# Patient Record
Sex: FEMALE | Race: WHITE | NOT HISPANIC OR LATINO | Employment: FULL TIME | URBAN - METROPOLITAN AREA
[De-identification: names, ages, dates, MRNs, and addresses within clinical notes are randomized per-mention and may not be internally consistent; named-entity substitution may affect disease eponyms.]

---

## 2023-12-04 ENCOUNTER — ANCILLARY PROCEDURE (OUTPATIENT)
Dept: RADIOLOGY | Facility: CLINIC | Age: 38
End: 2023-12-04
Payer: COMMERCIAL

## 2023-12-04 DIAGNOSIS — M79.645 THUMB PAIN, LEFT: Primary | ICD-10-CM

## 2023-12-04 DIAGNOSIS — M79.645 THUMB PAIN, LEFT: ICD-10-CM

## 2023-12-04 PROCEDURE — 73140 X-RAY EXAM OF FINGER(S): CPT | Mod: LEFT SIDE | Performed by: RADIOLOGY

## 2023-12-04 PROCEDURE — 73140 X-RAY EXAM OF FINGER(S): CPT | Mod: LT,FY

## 2023-12-05 ENCOUNTER — OFFICE VISIT (OUTPATIENT)
Dept: SPORTS MEDICINE | Facility: HOSPITAL | Age: 38
End: 2023-12-05
Payer: COMMERCIAL

## 2023-12-05 VITALS — DIASTOLIC BLOOD PRESSURE: 86 MMHG | OXYGEN SATURATION: 97 % | HEART RATE: 78 BPM | SYSTOLIC BLOOD PRESSURE: 125 MMHG

## 2023-12-05 DIAGNOSIS — S62.525A CLOSED NONDISPLACED FRACTURE OF DISTAL PHALANX OF LEFT THUMB, INITIAL ENCOUNTER: Primary | ICD-10-CM

## 2023-12-05 PROCEDURE — 1036F TOBACCO NON-USER: CPT | Performed by: PEDIATRICS

## 2023-12-05 PROCEDURE — 99203 OFFICE O/P NEW LOW 30 MIN: CPT | Performed by: PEDIATRICS

## 2023-12-05 PROCEDURE — 99213 OFFICE O/P EST LOW 20 MIN: CPT | Performed by: PEDIATRICS

## 2023-12-05 PROCEDURE — L3984 UPPER EXT FX ORTHOSIS WRIST: HCPCS | Performed by: PEDIATRICS

## 2023-12-05 RX ORDER — ALBUTEROL SULFATE 90 UG/1
AEROSOL, METERED RESPIRATORY (INHALATION)
COMMUNITY
Start: 2023-01-09 | End: 2024-05-02 | Stop reason: WASHOUT

## 2023-12-05 ASSESSMENT — PAIN - FUNCTIONAL ASSESSMENT: PAIN_FUNCTIONAL_ASSESSMENT: 0-10

## 2023-12-05 ASSESSMENT — PAIN SCALES - GENERAL: PAINLEVEL_OUTOF10: 4

## 2023-12-05 NOTE — PROGRESS NOTES
No chief complaint on file.      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  She was walkingup the steps with packages in her hand and tripped, htting her thumb          Past [facility]K HX:  Specialty Problems    None       ROS  12 point ROS reviewed and is negative except for items listed   none    Social Hx:  Home:  lives with her   PT    Medications:   No current outpatient medications on file prior to visit.     No current facility-administered medications on file prior to visit.         Allergies:  Not on File     Physical Exam:    There were no vitals taken for this visit.   General appearance: Well-appearing well-nourished  Psych: Normal mood and affect    Neuro: Normal sensation to light touch throughout the involved extremities  Vascular: No extremity edema or discoloration.  Skin: negative.  Lymphatic: no regional lymphadenopathy present.  Eyes: no conjunctival injection.    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  She understands that full healing will occur in 8 to 10 weeks as she is more comfortable she can discontinue use of the Exos.    Patient was prescribed a thumb EXOS transverse distal phalanx fx  [orthosis] for [diagnosis].The patient is ambulatory with or without aid; but, has weakness, instability and/or deformity of their [right / left] [extremity] which requires stabilization from this orthosis to improve their function.      Verbal and written instructions for the use, wear schedule, cleaning and application of this item were given.  Patient was instructed that should the brace result in increased pain, decreased sensation, increased swelling, or an overall worsening of their medical condition, to please contact our office immediately.     Orthotic management and training was provided for skin care, modifications due to healing tissues, edema changes, interruption in skin integrity, and safety precautions with the orthosis.          ** Please excuse any errors in grammar or translation related to this dictation. Voice recognition software was utilized to prepare this document. **

## 2024-05-02 ENCOUNTER — OFFICE VISIT (OUTPATIENT)
Dept: OBSTETRICS AND GYNECOLOGY | Facility: CLINIC | Age: 39
End: 2024-05-02
Payer: COMMERCIAL

## 2024-05-02 VITALS
WEIGHT: 158 LBS | SYSTOLIC BLOOD PRESSURE: 113 MMHG | HEIGHT: 69 IN | DIASTOLIC BLOOD PRESSURE: 80 MMHG | BODY MASS INDEX: 23.4 KG/M2

## 2024-05-02 DIAGNOSIS — Z12.4 CERVICAL CANCER SCREENING: ICD-10-CM

## 2024-05-02 DIAGNOSIS — Z31.89 ENCOUNTER FOR FERTILITY PLANNING: ICD-10-CM

## 2024-05-02 DIAGNOSIS — R87.618 OTHER ABNORMAL CYTOLOGICAL FINDING OF SPECIMEN FROM CERVIX: ICD-10-CM

## 2024-05-02 DIAGNOSIS — Z01.419 WELL WOMAN EXAM: Primary | ICD-10-CM

## 2024-05-02 PROCEDURE — 87624 HPV HI-RISK TYP POOLED RSLT: CPT

## 2024-05-02 PROCEDURE — 1036F TOBACCO NON-USER: CPT | Performed by: OBSTETRICS & GYNECOLOGY

## 2024-05-02 PROCEDURE — 99395 PREV VISIT EST AGE 18-39: CPT | Performed by: OBSTETRICS & GYNECOLOGY

## 2024-05-02 PROCEDURE — 88175 CYTOPATH C/V AUTO FLUID REDO: CPT

## 2024-05-02 ASSESSMENT — ENCOUNTER SYMPTOMS
SHORTNESS OF BREATH: 0
BACK PAIN: 0
FLANK PAIN: 0
BLOOD IN STOOL: 0
CONSTIPATION: 0
ABDOMINAL PAIN: 0
UNEXPECTED WEIGHT CHANGE: 0
HEMATURIA: 0
NAUSEA: 0
DIARRHEA: 0
ABDOMINAL DISTENTION: 0
CHILLS: 0
SLEEP DISTURBANCE: 0
DYSURIA: 0
COLOR CHANGE: 0
FEVER: 0
FATIGUE: 0
VOMITING: 0
FREQUENCY: 0
APPETITE CHANGE: 0

## 2024-05-02 ASSESSMENT — PAIN SCALES - GENERAL: PAINLEVEL: 0-NO PAIN

## 2024-05-02 NOTE — PROGRESS NOTES
India Fernandez is a 38 y.o.  here for to discuss fertility issues    Past med hx and past surg hx reviewed with patient and notable for: no new issues  Pt denies major medical problems. She is not on any prescription medications. She has never had any surgery.  She had an abnormal pap 2020 with negative colposcopy. She has not had pap follow up since that time.   She did not have HPV vaccine.     Concerns:    Exercise: regular - soccer once a week and walks dog twice per day  Works in outpatient PT.   works third shift at [employer].  They have had a lot of stress lately d/t 's father diagnosed with lymphoma and  may be bone marrow donor.     GynHx:  Cycles: regular cycles, 4-5 days, first few days are heavy ; denies significant pain with cycles ; no recent changes  No intermenstrual spotting or bleeding.   Sexually active: yes with one partner/   Contraception: none x 1 year    Sexually active with  x 5 years. Used hormonal birth control for 1 1/2 years.  Then used condoms until 1 year ago.  Tracking cycles and trying for sex during fertile window since that time. No positive pregnancy tests in the last year.     Pt reports her  is healthy, does not take any medications, is not a smoker. He does not have children with any other partners.     Patient's last menstrual period was 2024 (exact date).     OB History          0    Para   0    Term   0       0    AB   0    Living   0         SAB   0    IAB   0    Ectopic   0    Multiple   0    Live Births   0                 Objective     Review of Systems   Constitutional:  Negative for appetite change, chills, fatigue, fever and unexpected weight change.   Respiratory:  Negative for shortness of breath.    Cardiovascular:  Negative for chest pain.   Gastrointestinal:  Negative for abdominal distention, abdominal pain, blood in stool, constipation, diarrhea, nausea and vomiting.   Endocrine: Negative for  "cold intolerance and heat intolerance.   Genitourinary:  Negative for dyspareunia, dysuria, flank pain, frequency, genital sores, hematuria, menstrual problem, pelvic pain, urgency, vaginal bleeding, vaginal discharge and vaginal pain.   Musculoskeletal:  Negative for back pain.   Skin:  Negative for color change.   Psychiatric/Behavioral:  Negative for sleep disturbance.        /80 (BP Location: Left arm, Patient Position: Sitting)   Ht 1.74 m (5' 8.5\")   Wt 71.7 kg (158 lb)   LMP 04/16/2024 (Exact Date)   BMI 23.67 kg/m²     Physical Exam  Constitutional:       Appearance: Normal appearance.   HENT:      Head: Normocephalic and atraumatic.   Chest:   Breasts:     Right: Normal.      Left: Normal.   Abdominal:      General: Abdomen is flat.      Palpations: Abdomen is soft.      Tenderness: There is no abdominal tenderness.   Genitourinary:     General: Normal vulva.      Vagina: Normal.      Cervix: Normal.      Uterus: Normal.       Adnexa: Right adnexa normal and left adnexa normal.      Comments: Pap collected today    Skin:     General: Skin is warm and dry.   Neurological:      Mental Status: She is alert and oriented to person, place, and time.   Psychiatric:         Mood and Affect: Mood normal.          Assessment and Plan:  Routine Well Woman Exam Today.   Discussed diet and exercise and routine health screening.   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 Recommend referral to [facility]. Pt agreeable. Encouraged first available appointment with  or [facility] if patient desires.       No orders of the defined types were placed in this encounter.     "

## 2024-05-15 ENCOUNTER — TELEPHONE (OUTPATIENT)
Dept: OBSTETRICS AND GYNECOLOGY | Facility: CLINIC | Age: 39
End: 2024-05-15
Payer: COMMERCIAL

## 2024-05-15 NOTE — TELEPHONE ENCOUNTER
Called patient to discuss results   Left vm for patient to return call.    SYLWIA Kraft RN      ----- Message from Anita PEDERSEN MD sent at 5/15/2024  3:29 PM EDT -----  Please let patient know her pap smear was normal.  Her prior pap was LGSIL, HPV+.  I recommend repeat pap in one year and if that is normal she can return to routine screening. Thank you.

## 2024-05-16 ENCOUNTER — TELEPHONE (OUTPATIENT)
Dept: OBSTETRICS AND GYNECOLOGY | Facility: CLINIC | Age: 39
End: 2024-05-16
Payer: COMMERCIAL

## 2024-05-16 NOTE — TELEPHONE ENCOUNTER
Called patient to discuss results.  Identified by name and   Informed patient of normal results and recommended follow up is 1 year, and that if results are still normal then she may go back to routine screenings.   Patient verbalized understanding, no questions or concerns at this time.    SYLWIA Kraft RN    ----- Message from Anita PEDERSEN MD sent at 5/15/2024  3:29 PM EDT -----  Please let patient know her pap smear was normal.  Her prior pap was LGSIL, HPV+.  I recommend repeat pap in one year and if that is normal she can return to routine screening. Thank you.

## 2024-08-06 ASSESSMENT — LIFESTYLE VARIABLES
TOBACCO_USE: NO
HISTORY_ALCOHOL_USE: YES

## 2024-08-12 ENCOUNTER — CONSULT (OUTPATIENT)
Dept: ENDOCRINOLOGY | Facility: CLINIC | Age: 39
End: 2024-08-12
Payer: COMMERCIAL

## 2024-08-12 VITALS
HEIGHT: 69 IN | SYSTOLIC BLOOD PRESSURE: 123 MMHG | WEIGHT: 157 LBS | BODY MASS INDEX: 23.25 KG/M2 | DIASTOLIC BLOOD PRESSURE: 81 MMHG | HEART RATE: 67 BPM

## 2024-08-12 DIAGNOSIS — Z11.3 SCREENING FOR STDS (SEXUALLY TRANSMITTED DISEASES): ICD-10-CM

## 2024-08-12 DIAGNOSIS — Z01.83 ENCOUNTER FOR RH BLOOD TYPING: ICD-10-CM

## 2024-08-12 DIAGNOSIS — Z31.89 ENCOUNTER FOR FERTILITY PLANNING: ICD-10-CM

## 2024-08-12 DIAGNOSIS — Z31.41 FERTILITY TESTING: Primary | ICD-10-CM

## 2024-08-12 DIAGNOSIS — Z11.59 ENCOUNTER FOR SCREENING FOR OTHER VIRAL DISEASES: ICD-10-CM

## 2024-08-12 DIAGNOSIS — Z01.812 ENCOUNTER FOR PREPROCEDURAL LABORATORY EXAMINATION: ICD-10-CM

## 2024-08-12 PROCEDURE — 99214 OFFICE O/P EST MOD 30 MIN: CPT | Performed by: OBSTETRICS & GYNECOLOGY

## 2024-08-12 ASSESSMENT — PAIN SCALES - GENERAL: PAINLEVEL: 0-NO PAIN

## 2024-08-12 NOTE — PROGRESS NOTES
Visit Type: In Person    NEW FERTILITY PATIENT VISIT    Referred by: Referred by:: Dr. Anita Bergman    Accompanied today by: Who is accompanying you to your visit:: Tres ()      India Fernandez is a 39 y.o.  female who presents with primary infertility.  She and her partner desire to proceed with fertility testing.   this couple has been attempting to become pregnant over the course of the last 1 to 2 years without success.  The patient does have a history of OCP use, and she discontinued these medications 3 years ago.  She does report having regular, monthly menses.  She reports no prior attempts at becoming pregnant.  Her partner has no children from prior relationships.      Have you had any concerns about your fertility treatments so far? Have you had any concerns about your fertility treatments so far: No cutrent treatments.     What are you goals for today's visit? What are you goals for today's visit: Discuss steps to promote pregnancy     What causes of infertility have been identified on your workup so far? What causes of infertility have been identified on your workup so far: I have not had any workup so far.     Past Infertility Treatments: Have you undergone any treatments for fertility: No      Please summarize your fertility treatments to date.       PRIOR EVALUATION / TREATMENT    Prior Labs  Lab Results    Date Done      AMH: No results found for requested labs within last 1825 days. No results found for requested labs within last 1825 days.   TSH: 1.85 (Ref range: 0.44 - 3.98 mIU/L) 3/3/2021   PRL: No results found for requested labs within last 1825 days. No results found for requested labs within last 1825 days.   Testosterone: No results found for requested labs within last 1825 days. No results found for requested labs within last 1825 days.   DHEAS: No results found for requested labs within last 1825 days. No results found for requested labs within last 1825 days.    FSH: No results found for requested labs within last 1825 days. No results found for requested labs within last 1825 days.   17 OHP: No results found for requested labs within last 1825 days. No results found for requested labs within last 1825 days.   HgbA1c: No results found for requested labs within last 1825 days. No results found for requested labs within last 1825 days.   Hepatitis B surface antigen: No results found for requested labs within last 1825 days. No results found for requested labs within last 1825 days.   Hepatitis C antibody: No results found for requested labs within last 1825 days. No results found for requested labs within last 1825 days.   HIV ½ Antigen Antibody screen with reflex: No results found for requested labs within last 1825 days. No results found for requested labs within last 1825 days.   Syphilis screening with reflex: No results found for requested labs within last 1825 days. No results found for requested labs within last 1825 days.   GC: No results found for requested labs within last 1825 days. No results found for requested labs within last 1825 days.   CT: No results found for requested labs within last 1825 days. No results found for requested labs within last 1825 days.   Type and Screen: No results found for requested labs within last 1825 days. No results found for requested labs within last 1825 days.   Rh: No results found for requested labs within last 1825 days. No results found for requested labs within last 1825 days.   Antibody: No results found for requested labs within last 1825 days. No results found for requested labs within last 1825 days.   Rubella: No results found for requested labs within last 1825 days. No results found for requested labs within last 1825 days.   Varicella: No results found for requested labs within last 1825 days. No results found for requested labs within last 1825 days.   Hemoglobin: No results found for requested labs within last  1825 days. No results found for requested labs within last 1825 days.   Hematocrit: No results found for requested labs within last 1825 days. No results found for requested labs within last 1825 days.   Creatinine: 0.94 (Ref range: 0.50 - 1.05 mg/dL) 3/3/2021   AST:16 (Ref range: 9 - 39 U/L) 3/3/2021   ALT:11 (Ref range: 7 - 45 U/L): 3/3/2021        Relationship Status:       OB Hx     OB History          0    Para   0    Term   0       0    AB   0    Living   0         SAB   0    IAB   0    Ectopic   0    Multiple   0    Live Births   0                 GYN HISTORY   Have you ever been diagnosed with a sexually transmitted disease?    Please select all that are applicable:    Have you ever had Pelvic Inflammatory Disease? Have you ever had Pelvic Inflammatory Disease?: No    Have you had an abnormal PAP smear? Have you had an abnormal PAP smear?: Yes    Date & Result of last PAP smear:   Lab Results   Component Value Date    FINALINTERP  2024         A. THINPREP PAP CERVIX, SCREENING -     Specimen Adequacy  Satisfactory for evaluation; absence of endocervical/transformation zone component    General Categorization  Negative for intraepithelial lesion or malignancy.    Descriptive Interpretation  Negative for intraepithelial lesion or malignancy              Have you ever had an abnormal Mammogram? Have you ever had an abnormal mammogram?: No    Date & result of your last mammogram:    Do you have pelvic pain? Do you have pelvic pain?: No    How many times per week do you have intercourse? If applicable, how many times a week are you having intercourse, trying to get pregnant during your fertile window?: 2-3    Do you have pain with intercourse? Do you have pain with intercourse?: No    Do you use lubricants with intercourse? If applicable, what type of lubricant are you using?: None    Do you have pain with bowel movements? Do you have pain with bowel movements?: No              Do you have  pain with a full bladder? Do you have pain with a full bladder?: No    MENSTRUAL HISTORY  LMP: When was your last menstrual period?: Other    Menarche: If applicable, when was your first occurrence of menstruation?: Age 13    Contraception: What (if any) type of birth control do you currently use?: None    Cycle length: What is the average number of days between menstrual cycles?: 29    Describe your bleeding: Describe your bleeding:: Average    Dysmenorrhea: Are your menstrual periods painful?: Yes       ENDOCRINE/INFERTILITY HISTORY  Duration of infertility: If applicable, what is the approximate date you began trying to get pregnant?: 1 year    Coital Activity/week: If applicable, how many times a week are you having intercourse, trying to get pregnant during your fertile window?: 2-3    Nipple Discharge: Do you experience any loss of milk or liquid discharge from the breasts?: No    Vision changes: Are you experiencing any vision changes?: No    Headaches: Are you experiencing headaches?: No    Excess hair growth: Are you experiencing persistent or worsening hair growth on the face, breasts or lower abdomen?: No    Excessive hair loss: Are you experiencing loss of hair from your scalp?: No    Acne: Are you experiencing acne?: No    Oily skin: Oily skin?: No    Recent weight change  Weight gain: Are you experiencing any increase in weight?: No    Weight loss: Are you experiencing any decrease in weight?: No    Exercise more than 3 times a week: Exercise more than 3 times a week?: No      PMH  Past Medical History:   Diagnosis Date    Abnormal Pap smear of cervix 2011?    HPV (human papilloma virus) infection 2011?        MEDICATIONS  No current outpatient medications on file prior to visit.     No current facility-administered medications on file prior to visit.        PSH  Past Surgical History:   Procedure Laterality Date    COLPOSCOPY  1/9/21?        PSYCH HISTORY  Have you ever been diagnosed with a mental  health Issue?: No    Have you ever been hospitalized for a mental health disorder?: No       SOCIAL HISTORY  Social History     Tobacco Use    Smoking status: Never    Smokeless tobacco: Never   Substance Use Topics    Alcohol use: Yes     Alcohol/week: 4.0 - 6.0 standard drinks of alcohol     Types: 2 - 3 Glasses of wine, 2 - 3 Cans of beer per week     Comment: socially    Drug use: Never     Occupation: Your occupation:: Physical therapist    Have you ever been incarcerated? Have you ever been incarcerated?: No    Do you have a history of domestic violence? Do you have a history of domestic violence?: No    Do you feel safe at home? Do you feel safe at home?: Yes    Do you have a history of any negative sexual experience such as incest or rape? Do you have a history of any negative sexual experience such as incest or rape?: No       PARTNER HISTORY  Partner Name: Partner name:: Tres Fernandez    Partner : Partner :: [id number]    Partner email: Partner email address: [email or web address]    Occupation: Partner occupation:: PA at [employer]    Prior fertility history:    PMH:    PSH: Partner Past Surgical History:: Tonsillectomy and deviated septum ; bone marrow donor     Smoking:Partner: Recent or current tobacco use: No    Alcohol Use: Partner: Recent or current alcohol use: Yes    Drug Use: Partner: Recent or current drug use: No    Medications: Partner Medications: None    Injuries: Partner: Any history of surgeries or injuries in the reproductive area?: No    STD:    Please select all that are applicable:    SA: Prior Semen Analysis completed?: No    SA Results:      FAMILY HISTORY  Family History   Problem Relation Name Age of Onset    Heart disease Father Kieran     Hypertension Father Kieran     Breast cancer Father's Sister Kimani        CANCER HISTORY    Breast: Breast Cancer: Please answer Yes, No or Unsure. If Yes, please, identify which family member.: Yes Aunt on fathers side    Ovarian:  "Ovarian Cancer: Please answer Yes, No or Unsure. If Yes, please, identify which family member.: No    Colon: Colon Cancer: Please answer Yes, No or Unsure. If Yes, please, identify which family member.: No    Endometrial: Endometrial Cancer: Please answer Yes, No or Unsure. If Yes, please, identify which family member.: No      FAMILY VTE HISTORY  Family History of Blood Clots: Blood Clots: Please answer Yes, No or Unsure. If Yes, please, identify which family member.: No      GENETIC HISTORY  Ethnic Background  Patient: Ethnic Background Patient:: White    Partner: Ethnic Background Partner:: Maltese    Genetic Disease in Family  Patient: Patient: Defects and genetic syndromes? Please answer Yes, No or Unsure: No    Partner: Partner: Defects and genetic syndromes? Please answer Yes, No or Unsure: No    Birth Defects in Family  Patient: Patient: Birth defects?: No    Partner: Partner: Birth defects? : No    Genetic screening performed previously:       BMI:   BMI Readings from Last 1 Encounters:   24 23.52 kg/m²     VITALS:  /81 (BP Location: Right arm, Patient Position: Sitting, BP Cuff Size: Adult)   Pulse 67   Ht 1.74 m (5' 8.5\")   Wt 71.2 kg (157 lb)   LMP 2024 (Exact Date)   BMI 23.52 kg/m²     GEN: alert and oriented, cooperative, no distress, appears stated age  Psych:  Exhibits appropriate mood and judgement.  HEENT: NC/AT  Resp: Normal respiratory effort, no use of accessory muscles  Ext: No clubbing, cyanosis, or edema    ASSESSMENT   India is a 39 y.o.  female with primary infertility x 1 to 2 years.she desires to proceed with fertility testing   partner SA:  Ordered    COUNSELING  We discussed causes of infertility including hormonal, egg quality issues, structural problems such as endometriosis, adhesions, or tubal problems, uterine factors such as polyps or fibroids, and sperm issues. Reviewed evaluation of such as well. We discussed various methods for achieving " pregnancy in some detail including, ovulation induction, insemination, superovulation and IVF.        Routine Testing  Fertility Center  STDs Within 1 year   Genetic carrier Waiver/Completed   T&S Within 1 year   AMH Within 1 year   TSH Within 1 year   Rubella/Varicella Within 5 years     PLAN  Orders Placed This Encounter   Procedures    Hysterosalpingogram (HSG)    US pelvis transvaginal    FL hysterosalpingogram    Antimullerian Hormone (Amh)    TSH with reflex to Free T4 if abnormal    Type And Screen    Rubella Antibody, Igg    Varicella Zoster Antibody, Igg    Hepatitis B surface antigen    Hepatitis C Antibody    HIV 1/2 Antigen/Antibody Screen with Reflex to Confirmation    Syphilis Screen with Reflex    C. Trachomatis / N. Gonorrhoeae, Amplified Detection    POCT pregnancy, urine manually resulted       GENETIC SCREENING PATIENT  Will consider    PARTNER  Yes Semen Analysis: Ordered  Yes Genetic screening: Will consider    FOLLOW UP   Consults: Financial  Chart to primary nurse for care coordination and patient check list/education  Enroll in Engaged MD  Take prenatal vitamins, vitamin D 2000 IUs daily  Discussed that pap and mammogram must be updated per ACOG guidelines before treatment can begin  Discussed that treatment cannot proceed until checklist items are complete   6 week follow up with MD Quintero  Additional testing for BMI < 18 or > 40: No  Sperm Donor:      MD Completion:  Ectopic Risk: No  Medically Complex: No    Fertility Plan Update: Follow-up with Dr. Crouch once workup is complete    Maurice Harrison  08/12/2024  9:28 AM

## 2024-08-12 NOTE — LETTER
2024     Anita PEDERSEN MD  [address]  [facility], [facility] [address]  [facility] [address]    Patient: India Fernandez   YOB: 1985   Date of Visit: 2024       Dear Dr. Anita PEDERSEN MD:    Thank you for referring India Fernandez to me for evaluation. Below are my notes for this consultation.  If you have questions, please do not hesitate to call me. I look forward to following your patient along with you.       Sincerely,     Maurice Harrison MD      CC: No Recipients  ______________________________________________________________________________________      Visit Type: In Person    NEW FERTILITY PATIENT VISIT    Referred by: Referred by:: Dr. Anita Bergman    Accompanied today by: Who is accompanying you to your visit:: Tres ()      India Fernandez is a 39 y.o.  female who presents with primary infertility.  She and her partner desire to proceed with fertility testing.   this couple has been attempting to become pregnant over the course of the last 1 to 2 years without success.  The patient does have a history of OCP use, and she discontinued these medications 3 years ago.  She does report having regular, monthly menses.  She reports no prior attempts at becoming pregnant.  Her partner has no children from prior relationships.      Have you had any concerns about your fertility treatments so far? Have you had any concerns about your fertility treatments so far: No cutrent treatments.     What are you goals for today's visit? What are you goals for today's visit: Discuss steps to promote pregnancy     What causes of infertility have been identified on your workup so far? What causes of infertility have been identified on your workup so far: I have not had any workup so far.     Past Infertility Treatments: Have you undergone any treatments for fertility: No      Please summarize your fertility treatments to date.       PRIOR EVALUATION /  TREATMENT    Prior Labs  Lab Results    Date Done      AMH: No results found for requested labs within last 1825 days. No results found for requested labs within last 1825 days.   TSH: 1.85 (Ref range: 0.44 - 3.98 mIU/L) 3/3/2021   PRL: No results found for requested labs within last 1825 days. No results found for requested labs within last 1825 days.   Testosterone: No results found for requested labs within last 1825 days. No results found for requested labs within last 1825 days.   DHEAS: No results found for requested labs within last 1825 days. No results found for requested labs within last 1825 days.   FSH: No results found for requested labs within last 1825 days. No results found for requested labs within last 1825 days.   17 OHP: No results found for requested labs within last 1825 days. No results found for requested labs within last 1825 days.   HgbA1c: No results found for requested labs within last 1825 days. No results found for requested labs within last 1825 days.   Hepatitis B surface antigen: No results found for requested labs within last 1825 days. No results found for requested labs within last 1825 days.   Hepatitis C antibody: No results found for requested labs within last 1825 days. No results found for requested labs within last 1825 days.   HIV ½ Antigen Antibody screen with reflex: No results found for requested labs within last 1825 days. No results found for requested labs within last 1825 days.   Syphilis screening with reflex: No results found for requested labs within last 1825 days. No results found for requested labs within last 1825 days.   GC: No results found for requested labs within last 1825 days. No results found for requested labs within last 1825 days.   CT: No results found for requested labs within last 1825 days. No results found for requested labs within last 1825 days.   Type and Screen: No results found for requested labs within last 1825 days. No results found for  requested labs within last 1825 days.   Rh: No results found for requested labs within last 1825 days. No results found for requested labs within last 1825 days.   Antibody: No results found for requested labs within last 1825 days. No results found for requested labs within last 1825 days.   Rubella: No results found for requested labs within last 1825 days. No results found for requested labs within last 1825 days.   Varicella: No results found for requested labs within last 1825 days. No results found for requested labs within last 1825 days.   Hemoglobin: No results found for requested labs within last 1825 days. No results found for requested labs within last 1825 days.   Hematocrit: No results found for requested labs within last 1825 days. No results found for requested labs within last 1825 days.   Creatinine: 0.94 (Ref range: 0.50 - 1.05 mg/dL) 3/3/2021   AST:16 (Ref range: 9 - 39 U/L) 3/3/2021   ALT:11 (Ref range: 7 - 45 U/L): 3/3/2021        Relationship Status:       OB Hx     OB History          0    Para   0    Term   0       0    AB   0    Living   0         SAB   0    IAB   0    Ectopic   0    Multiple   0    Live Births   0                 GYN HISTORY   Have you ever been diagnosed with a sexually transmitted disease?    Please select all that are applicable:    Have you ever had Pelvic Inflammatory Disease? Have you ever had Pelvic Inflammatory Disease?: No    Have you had an abnormal PAP smear? Have you had an abnormal PAP smear?: Yes    Date & Result of last PAP smear:   Lab Results   Component Value Date    FINALINTERP  2024         A. THINPREP PAP CERVIX, SCREENING -     Specimen Adequacy  Satisfactory for evaluation; absence of endocervical/transformation zone component    General Categorization  Negative for intraepithelial lesion or malignancy.    Descriptive Interpretation  Negative for intraepithelial lesion or malignancy              Have you ever had an abnormal  Mammogram? Have you ever had an abnormal mammogram?: No    Date & result of your last mammogram:    Do you have pelvic pain? Do you have pelvic pain?: No    How many times per week do you have intercourse? If applicable, how many times a week are you having intercourse, trying to get pregnant during your fertile window?: 2-3    Do you have pain with intercourse? Do you have pain with intercourse?: No    Do you use lubricants with intercourse? If applicable, what type of lubricant are you using?: None    Do you have pain with bowel movements? Do you have pain with bowel movements?: No              Do you have pain with a full bladder? Do you have pain with a full bladder?: No    MENSTRUAL HISTORY  LMP: When was your last menstrual period?: Other    Menarche: If applicable, when was your first occurrence of menstruation?: Age 13    Contraception: What (if any) type of birth control do you currently use?: None    Cycle length: What is the average number of days between menstrual cycles?: 29    Describe your bleeding: Describe your bleeding:: Average    Dysmenorrhea: Are your menstrual periods painful?: Yes       ENDOCRINE/INFERTILITY HISTORY  Duration of infertility: If applicable, what is the approximate date you began trying to get pregnant?: 1 year    Coital Activity/week: If applicable, how many times a week are you having intercourse, trying to get pregnant during your fertile window?: 2-3    Nipple Discharge: Do you experience any loss of milk or liquid discharge from the breasts?: No    Vision changes: Are you experiencing any vision changes?: No    Headaches: Are you experiencing headaches?: No    Excess hair growth: Are you experiencing persistent or worsening hair growth on the face, breasts or lower abdomen?: No    Excessive hair loss: Are you experiencing loss of hair from your scalp?: No    Acne: Are you experiencing acne?: No    Oily skin: Oily skin?: No    Recent weight change  Weight gain: Are you  experiencing any increase in weight?: No    Weight loss: Are you experiencing any decrease in weight?: No    Exercise more than 3 times a week: Exercise more than 3 times a week?: No      PMH  Past Medical History:   Diagnosis Date   • Abnormal Pap smear of cervix ?   • HPV (human papilloma virus) infection ?        MEDICATIONS  No current outpatient medications on file prior to visit.     No current facility-administered medications on file prior to visit.        PSH  Past Surgical History:   Procedure Laterality Date   • COLPOSCOPY  21?        PSYCH HISTORY  Have you ever been diagnosed with a mental health Issue?: No    Have you ever been hospitalized for a mental health disorder?: No       SOCIAL HISTORY  Social History     Tobacco Use   • Smoking status: Never   • Smokeless tobacco: Never   Substance Use Topics   • Alcohol use: Yes     Alcohol/week: 4.0 - 6.0 standard drinks of alcohol     Types: 2 - 3 Glasses of wine, 2 - 3 Cans of beer per week     Comment: socially   • Drug use: Never     Occupation: Your occupation:: Physical therapist    Have you ever been incarcerated? Have you ever been incarcerated?: No    Do you have a history of domestic violence? Do you have a history of domestic violence?: No    Do you feel safe at home? Do you feel safe at home?: Yes    Do you have a history of any negative sexual experience such as incest or rape? Do you have a history of any negative sexual experience such as incest or rape?: No       PARTNER HISTORY  Partner Name: Partner name:: Tres Fernandez    Partner : Partner :: [id number]    Partner email: Partner email address: [email or web address]    Occupation: Partner occupation:: PA at [employer]    Prior fertility history:    PMH:    PSH: Partner Past Surgical History:: Tonsillectomy and deviated septum ; bone marrow donor     Smoking:Partner: Recent or current tobacco use: No    Alcohol Use: Partner: Recent or current alcohol use: Yes    Drug Use:  "Partner: Recent or current drug use: No    Medications: Partner Medications: None    Injuries: Partner: Any history of surgeries or injuries in the reproductive area?: No    STD:    Please select all that are applicable:    SA: Prior Semen Analysis completed?: No    SA Results:      FAMILY HISTORY  Family History   Problem Relation Name Age of Onset   • Heart disease Father Kieran    • Hypertension Father Kieran    • Breast cancer Father's Sister Kimani        CANCER HISTORY    Breast: Breast Cancer: Please answer Yes, No or Unsure. If Yes, please, identify which family member.: Yes Aunt on fathers side    Ovarian: Ovarian Cancer: Please answer Yes, No or Unsure. If Yes, please, identify which family member.: No    Colon: Colon Cancer: Please answer Yes, No or Unsure. If Yes, please, identify which family member.: No    Endometrial: Endometrial Cancer: Please answer Yes, No or Unsure. If Yes, please, identify which family member.: No      FAMILY VTE HISTORY  Family History of Blood Clots: Blood Clots: Please answer Yes, No or Unsure. If Yes, please, identify which family member.: No      GENETIC HISTORY  Ethnic Background  Patient: Ethnic Background Patient:: White    Partner: Ethnic Background Partner:: [place]    Genetic Disease in Family  Patient: Patient: Defects and genetic syndromes? Please answer Yes, No or Unsure: No    Partner: Partner: Defects and genetic syndromes? Please answer Yes, No or Unsure: No    Birth Defects in Family  Patient: Patient: Birth defects?: No    Partner: Partner: Birth defects? : No    Genetic screening performed previously:       BMI:   BMI Readings from Last 1 Encounters:   08/12/24 23.52 kg/m²     VITALS:  /81 (BP Location: Right arm, Patient Position: Sitting, BP Cuff Size: Adult)   Pulse 67   Ht 1.74 m (5' 8.5\")   Wt 71.2 kg (157 lb)   LMP 08/11/2024 (Exact Date)   BMI 23.52 kg/m²     GEN: alert and oriented, cooperative, no distress, appears stated age  Psych:  " Exhibits appropriate mood and judgement.  HEENT: NC/AT  Resp: Normal respiratory effort, no use of accessory muscles  Ext: No clubbing, cyanosis, or edema    ASSESSMENT   India is a 39 y.o.  female with primary infertility x 1 to 2 years.she desires to proceed with fertility testing   partner SA:  Ordered    COUNSELING  We discussed causes of infertility including hormonal, egg quality issues, structural problems such as endometriosis, adhesions, or tubal problems, uterine factors such as polyps or fibroids, and sperm issues. Reviewed evaluation of such as well. We discussed various methods for achieving pregnancy in some detail including, ovulation induction, insemination, superovulation and IVF.        Routine Testing  Fertility Center  STDs Within 1 year   Genetic carrier Waiver/Completed   T&S Within 1 year   AMH Within 1 year   TSH Within 1 year   Rubella/Varicella Within 5 years     PLAN  Orders Placed This Encounter   Procedures   • Hysterosalpingogram (HSG)   • US pelvis transvaginal   • FL hysterosalpingogram   • Antimullerian Hormone (Amh)   • TSH with reflex to Free T4 if abnormal   • Type And Screen   • Rubella Antibody, Igg   • Varicella Zoster Antibody, Igg   • Hepatitis B surface antigen   • Hepatitis C Antibody   • HIV 1/2 Antigen/Antibody Screen with Reflex to Confirmation   • Syphilis Screen with Reflex   • C. Trachomatis / N. Gonorrhoeae, Amplified Detection   • POCT pregnancy, urine manually resulted       GENETIC SCREENING PATIENT  Will consider    PARTNER  Yes Semen Analysis: Ordered  Yes Genetic screening: Will consider    FOLLOW UP   Consults: Financial  Chart to primary nurse for care coordination and patient check list/education  Enroll in Engaged MD  Take prenatal vitamins, vitamin D 2000 IUs daily  Discussed that pap and mammogram must be updated per ACOG guidelines before treatment can begin  Discussed that treatment cannot proceed until checklist items are complete   6 week  follow up with MD Quintero  Additional testing for BMI < 18 or > 40: No  Sperm Donor:      MD Completion:  Ectopic Risk: No  Medically Complex: No    Fertility Plan Update: Follow-up with Dr. Crouch once workup is complete    Maurice Harrison  08/12/2024  9:28 AM

## 2024-09-18 ENCOUNTER — ANCILLARY PROCEDURE (OUTPATIENT)
Dept: ENDOCRINOLOGY | Facility: CLINIC | Age: 39
End: 2024-09-18
Payer: COMMERCIAL

## 2024-09-18 DIAGNOSIS — Z31.41 FERTILITY TESTING: ICD-10-CM

## 2024-09-18 PROCEDURE — 99499 UNLISTED E&M SERVICE: CPT | Performed by: OBSTETRICS & GYNECOLOGY

## 2024-09-18 PROCEDURE — 76830 TRANSVAGINAL US NON-OB: CPT

## 2024-09-18 PROCEDURE — 76830 TRANSVAGINAL US NON-OB: CPT | Performed by: OBSTETRICS & GYNECOLOGY

## 2024-09-20 ENCOUNTER — ANCILLARY PROCEDURE (OUTPATIENT)
Dept: ENDOCRINOLOGY | Facility: CLINIC | Age: 39
End: 2024-09-20
Payer: COMMERCIAL

## 2024-09-20 ENCOUNTER — HOSPITAL ENCOUNTER (OUTPATIENT)
Dept: RADIOLOGY | Facility: HOSPITAL | Age: 39
Discharge: HOME | End: 2024-09-20
Payer: COMMERCIAL

## 2024-09-20 DIAGNOSIS — Z31.41 FERTILITY TESTING: ICD-10-CM

## 2024-09-20 DIAGNOSIS — Z31.41 FERTILITY TESTING: Primary | ICD-10-CM

## 2024-09-20 DIAGNOSIS — Z01.812 ENCOUNTER FOR PREPROCEDURAL LABORATORY EXAMINATION: ICD-10-CM

## 2024-09-20 LAB — PREGNANCY TEST URINE, POC: NEGATIVE

## 2024-09-20 PROCEDURE — 2550000001 HC RX 255 CONTRASTS: Performed by: OBSTETRICS & GYNECOLOGY

## 2024-09-20 PROCEDURE — 58340 CATHETER FOR HYSTEROGRAPHY: CPT

## 2024-09-20 PROCEDURE — 74740 X-RAY FEMALE GENITAL TRACT: CPT

## 2024-09-20 NOTE — PROGRESS NOTES
Patient ID: India Fernandez is a 39 y.o. female.    HSG    Date/Time: 9/20/2024 7:06 AM    Performed by: ALINE Matthews  Authorized by: ALINE Matthews    Consent:     Consent obtained:  Verbal and written    Consent given by:  Patient    Risks, benefits, and alternatives were discussed: yes      Risks discussed:  Bleeding, infection and pain  Universal protocol:     Procedure explained and questions answered to patient or proxy's satisfaction: yes      Relevant documents present and verified: yes      Test results available: yes      Imaging studies available: yes      Required blood products, implants, devices, and special equipment available: yes      Immediately prior to procedure, a time out was called: yes      Patient identity confirmed:  Verbally with patient, arm band and hospital-assigned identification number  Indications:     Indications:  Fertility testing  Pre-procedure details:     Skin preparation:  Povidone-iodine  Sedation:     Sedation type:  None  Anesthesia:     Anesthesia method:  None  Procedure specific details:      LANE Garcia CNP performed this procedure      Hysterosalpingogram (HSG) risks, benefits, alternatives, and personnel discussed with patient who agreed to proceed.    Procedural time out        Done in room where procedure done: Yes        Done just before starting procedure: Yes                                                 All members of procedural team involved in time-out: Yes                  Active communication used: Yes                                                         All team members agreed on procedure: Yes                                        Patient correctly identified by two identifiers: Yes                                  Correct side and site identified: Yes                                                     All needed special equipment/instruments available: Yes               Prior to the start of the procedure a time out was taken and  the following were verified: The identity of the patient using two patient identifiers.   Urine pregnancy test was performed and was negative.   Risks, benefits, and alternatives of the procedure were explained to the patient.  Informed consent was obtained.     The patient was placed in the dorsal lithotomy position and a sterile speculum was placed in the vagina. The cervix was sterilized with Betadine x 3. The anterior lip of the cervix was grasped with a single-tooth tenaculum. The acorn cannula was then placed in the cervix. The patient was positioned and images were taken with fluoroscopy as dye was inserted through the cannula. All instruments were then removed. The patient tolerated the procedure well and was discharged home the same day without complications.    Uterus:  normal contour without filling defects  Tubes:  bilateral patency with free spill of dye, normal tubal architecture noted, and some distal dilation noted bilaterally.  Based on these findings, my recommendation is: No further follow up required. DR. Crouch will review the images and if any additional testing is needed I will reach back to patient.    The patient was counseled regarding the above findings and images were reviewed with patient at the time of the exam.     Andreia Garcia CNP 09/20/24 7:50 AM       Post-procedure details:     Procedure completion:  Tolerated well, no immediate complications

## 2024-10-02 ENCOUNTER — APPOINTMENT (OUTPATIENT)
Dept: ENDOCRINOLOGY | Facility: CLINIC | Age: 39
End: 2024-10-02
Payer: COMMERCIAL

## 2024-11-20 ENCOUNTER — TELEMEDICINE (OUTPATIENT)
Dept: ENDOCRINOLOGY | Facility: CLINIC | Age: 39
End: 2024-11-20
Payer: COMMERCIAL

## 2024-11-20 VITALS — BODY MASS INDEX: 23.79 KG/M2 | HEIGHT: 68 IN | WEIGHT: 157 LBS

## 2024-11-20 DIAGNOSIS — Z31.81 ENCOUNTER FOR MALE FACTOR INFERTILITY IN FEMALE PATIENT: Primary | ICD-10-CM

## 2024-11-20 DIAGNOSIS — N97.8 ENCOUNTER FOR MALE FACTOR INFERTILITY IN FEMALE PATIENT: Primary | ICD-10-CM

## 2024-11-20 PROCEDURE — 3008F BODY MASS INDEX DOCD: CPT | Performed by: STUDENT IN AN ORGANIZED HEALTH CARE EDUCATION/TRAINING PROGRAM

## 2024-11-20 PROCEDURE — 99213 OFFICE O/P EST LOW 20 MIN: CPT | Performed by: STUDENT IN AN ORGANIZED HEALTH CARE EDUCATION/TRAINING PROGRAM

## 2024-11-20 PROCEDURE — 1036F TOBACCO NON-USER: CPT | Performed by: STUDENT IN AN ORGANIZED HEALTH CARE EDUCATION/TRAINING PROGRAM

## 2024-11-20 ASSESSMENT — PATIENT HEALTH QUESTIONNAIRE - PHQ9
2. FEELING DOWN, DEPRESSED OR HOPELESS: NOT AT ALL
1. LITTLE INTEREST OR PLEASURE IN DOING THINGS: NOT AT ALL
SUM OF ALL RESPONSES TO PHQ9 QUESTIONS 1 AND 2: 0

## 2024-11-20 ASSESSMENT — PAIN SCALES - GENERAL: PAINLEVEL_OUTOF10: 0-NO PAIN

## 2024-11-20 NOTE — PROGRESS NOTES
Virtual or Telephone Consent: An interactive audio and video telecommunication system which permits real time communications between the patient (at the originating site) and provider (at the distant site) was utilized to provide this telehealth service    Follow Up Visit HPI    Patient is a 39 y.o.  female with  primary infertility x 2 years  presenting today for follow up visit. They were seen by Dr. Harrison on 2024 with plan for TVUS, HSG, and labs (AMH, TSH, prenatals, STIs). Partner SA notable for azoospermia.    Testing to date:   Lab Results from [facility] 10/02/2024  GC/CT negative  AMH 1.65  Rubella immune  RPR nonreactive  HIV nonreactive  Varicella immune  Hep C nonreactive  TSH 2.27  HbSAg negative    Hysterosalpingogram: FL HYSTEROSALPINGOGRAM (2024):  Normal cavity, bilateral spill     GYN Pelvic Ultrasound: US PELVIS TRANSVAGINAL (2024):  Somewhat increased vascularity in myometrium of uterus may be suggestive of adenomyosis. Ovaries within normal limits bilaterally. Small free fluid in the cul-de-sac.     Partner SA: Azoospermia   Latest Reference Range & Units 10/29/24 11:00   Abstinence (days) 2 - 7 days 3   Agglutination (Semen)  No   Clumps (Semen)  No   Collected Completely  Yes   Collection Location  Home   Collection Method  Masturbation   CONCENTRATION CN (Post-Wash) mill/mL 0.02   Concentration(Semen) >=15 mill/mL 0 !   Debris (Semen)  Yes   Leukocyte (Semen)  Negative   Non Prog. Motility (Semen) % 0   NON PROG. MOTILITY CN (Post-Wash) % 0   POCT SEMEN ANALYSIS COMPLETE WITH STRICT MORPHOLOGY  Rpt !   Prog. Motility (Semen) >=32 % 0 !   PROG. MOTILITY CN (Post-Wash) % 0   Semen Appearance  Normal   Semen Liquefaction  Normal   Semen Viscosity  Normal   Total Motility (Semen) >=40 % 0 !   TOTAL MOTILITY CN (Post-Wash) % 0   Total No of Motile (Semen) mill 0   TOTAL NO OF MOTILE CN (Post-wash) mill 0   Total No of Rnd Cells (Semen) <=5 mill 1.0   Total No of Sperm  "(Semen) >=39 mill 0 !   TOTAL NO OF SPERM CN (Post-Wash) mill 0   Volume (Semen) >=1.5 mL 2.6   VOLUME CN (Post-Wash) mL 0.2   !: Data is abnormal  Rpt: View report in Results Review for more information    Treatment to date: None      Past Medical History:   Diagnosis Date    Abnormal Pap smear of cervix ?    HPV (human papilloma virus) infection ?     Past Surgical History:   Procedure Laterality Date    COLPOSCOPY  21?     No current outpatient medications on file prior to visit.     No current facility-administered medications on file prior to visit.       BMI:   BMI Readings from Last 1 Encounters:   24 23.87 kg/m²     VITALS:  Ht 1.727 m (5' 8\")   Wt 71.2 kg (157 lb)   LMP 2024   BMI 23.87 kg/m²   LMP: Patient's last menstrual period was 2024.    ASSESSMENT   39 y.o.  female with primary infertility x 2 years, Male infertility and the following pertinent medical issues: none .  Partner SA: Azoospermia    COUNSELING  Discussed results of SA which indicate azoospermia. Also low volume and low concentration. Recommend Consult with Dr. Griggs [phone] for further evaluation and management. Reviewed that azoospermia may be obstructive or nonobstructive and that it may be possible to retrieve sperm via a procedure, in which case IVF would be recommended.   Recommend patient schedule consult with Dr. Anson fontanez and also call our  to schedule IVF consult.     Routine Testing  Fertility Center  STDs Within 1 year   Genetic carrier Waiver/Completed   T&S Within 1 year   AMH Within 1 year   TSH Within 1 year   Rubella/Varicella Within 5 years     BMI Testing  Fertility Center  CBC Within 1 year   CMP Within 1 year   HgbA1c Within 1 year   Mag, Phos, Vit D <18 Within 1 year   MFM > 40  REQ   Wt loss consult > 40 OPT     PLAN  Referral to Male Infertility placed for  given azoospermia  Schedule IVF consult    FOLLOW UP   Consults:  Urology " for partner  Engaged MD  Take prenatal vitamins, vitamin D 2000 IUs daily  Discussed that treatment cannot proceed until checklist items are complete.   Additional testing for BMI < 18 or > 40: NA.  Patient to schedule IVF consult. Advised patient to arrange this now with the .  Chart to primary nurse for care coordination and patient check list/education.    MD Completion:  Ectopic Risk: No  Medically Complex: No    Fertility Plan Update: Referral to Male Infertility placed for  given azoospermia. Schedule IVF consult.    Brittney Crouch  11/20/2024  8:17 AM

## 2024-12-17 ASSESSMENT — LIFESTYLE VARIABLES: CURRENT_SMOKER: NO

## 2024-12-18 ENCOUNTER — PATIENT MESSAGE (OUTPATIENT)
Dept: ENDOCRINOLOGY | Facility: CLINIC | Age: 39
End: 2024-12-18
Payer: COMMERCIAL

## 2024-12-18 ASSESSMENT — LIFESTYLE VARIABLES: CURRENT_SMOKER: NO

## 2024-12-19 ENCOUNTER — CONSULT (OUTPATIENT)
Dept: ENDOCRINOLOGY | Facility: CLINIC | Age: 39
End: 2024-12-19
Payer: COMMERCIAL

## 2024-12-19 DIAGNOSIS — Z01.812 ENCOUNTER FOR PREPROCEDURAL LABORATORY EXAMINATION: ICD-10-CM

## 2024-12-19 DIAGNOSIS — Z31.41 FERTILITY TESTING: Primary | ICD-10-CM

## 2024-12-19 DIAGNOSIS — Z31.83 ENCOUNTER FOR ASSISTED REPRODUCTIVE FERTILITY CYCLE: ICD-10-CM

## 2024-12-19 PROCEDURE — 99215 OFFICE O/P EST HI 40 MIN: CPT | Performed by: STUDENT IN AN ORGANIZED HEALTH CARE EDUCATION/TRAINING PROGRAM

## 2024-12-19 ASSESSMENT — PATIENT HEALTH QUESTIONNAIRE - PHQ9
2. FEELING DOWN, DEPRESSED OR HOPELESS: NOT AT ALL
SUM OF ALL RESPONSES TO PHQ9 QUESTIONS 1 AND 2: 0
1. LITTLE INTEREST OR PLEASURE IN DOING THINGS: NOT AT ALL

## 2024-12-19 NOTE — Clinical Note
Patient for IVF- plan in place for her but partner needs to see Dr. FRANK - mariana JAY that they aren't immediately ready, thanks!

## 2024-12-19 NOTE — PROGRESS NOTES
Visit Type: In Person    IVF Note     Patient is a 39 y.o.  female, here for IVF consult due to Male infertility  Here today with: NA  LMP: Patient's last menstrual period was 2024.  Menstrual cycles: Regular  AMH: 1.65  Status of fallopian tubes: FL HYSTEROSALPINGOGRAM (2024):  Normal cavity, bilateral spill   Saline Ultrasound: never  Hysteroscopy: to be ordered  Past Infertility Treatments: None    GYN HISTORY   HX of abnormal Mammo: No  Pap smear: 2024 NIL, HR HPV neg  Mammogram: never     PMH  Past Medical History:   Diagnosis Date    Abnormal Pap smear of cervix ?    HPV (human papilloma virus) infection ?     History of any clotting: No  History of hospitalizations or surgeries: No  History of easy bleeding/bruising: No    PSH  Past Surgical History:   Procedure Laterality Date    COLPOSCOPY  21?       Genetic screening Hx  Patient: Will defer until partner completes evaluation by Dr. FRANK, if specific genetic screening is recommended for partner would align patient screening    Social history  Social History     Tobacco Use    Smoking status: Never     Passive exposure: Never    Smokeless tobacco: Never   Substance Use Topics    Alcohol use: Yes     Alcohol/week: 4.0 - 6.0 standard drinks of alcohol     Types: 2 - 3 Glasses of wine, 2 - 3 Cans of beer per week     Comment: socially    Drug use: Never     Current smoker: No    Family history  Cognitive deficits: No  Birth defects: No  Other:     Current Meds  No current outpatient medications on file.     No current facility-administered medications for this visit.       Allergies  Patient has no known allergies.    Partner History  Tres Fernandez  Partner :: 83  SA in past year: Yes, Azoospermia      VITALS:  LMP 2024   BMI:   BMI Readings from Last 1 Encounters:   24 23.87 kg/m²       ASSESSMENT   39 y.o.  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 Hyde).  Indication for IVF: Male infertility  Partner SA: Azoospermia    We reviewed IVF and discussed the following:   In-vitro fertilization and embryo transfer  Stimulation protocols   Oocyte retrieval, risks    Cryopreservation   Assessment of fertilization   Embryo development  Statistics  ICSI/Assisted hatching   Embryo transfer and preparation    Risks of OHSS and multiple gestation   Cancelled cycles   Use of birth control   Selective reduction   Number of embryos to transfer   Ectopic pregnancy  and miscarriage  Team based care  Informed consent procedures  Folic acid supplementation   Genetic carrier screening   PGT  Frozen tissue storage and transport process  Discussed that pap and mammogram must be updated per ACOG guidelines before treatment can begin    ART Cycle Plan    1. Protocol/Fertility Plan Update:  Lead in: estrace  Stimulation protocol: Long Lupron ,   Trigger plan: HCG  Pre-retrieval meds: Antibiotics per protocol  Adjuncts:  none  Notes:     2. FET:  Protocol: Programmed  Adjuncts:  none  OCP candidate: Yes  Notes:     3. Insemination: PENDING DR. HYDE EVALUATION/MANAGEMENT  Sperm source: partner  Sperm collection method:  likely to be frozen if obtained via TESE, pending eval/tx with Dr. FRANK  Notes:  ICSI: Yes  # of oocytes to be fertilized: all    4. Transfer:   Number of embryos to replace: 1-2  Stage of embryo transfer: day 5  Trial transfer needed? No    5. Cryopreservation plan  PGT:  Considering, will discuss with partner, aware to decide prior to stim    Freeze all? Yes  Oocyte cryopreservation: No    6. Patient willing to accept blood transfusion: Yes    7. RN to review chart, initiate IVF boarding pass, and assure completion of the following prior to proceeding with IVF stimulation:       Orders Placed This Encounter   Procedures    Hysteroscopy diagnostic    POCT pregnancy, urine manually  resulted       STDs (Hepatitis B, Hepatitis C, HIV, Syphilis, GC/CT) for patient and partner (if applicable) to be completed within the last year (z11.3)  Genetic carrier testing: waiver or carrier screen completed with clearance documentation by provider for both patient and partner (z13.71)  Rubella and varicella to be completed within the last five years (z11.59)   TSH to be completed within the last year (z13.29)  Type & Screen to be completed within the last year (z01.83)  AMH to be completed within the last year (z31.41)  Pre-IVF Imaging: Reference any orders placed by provider.  Cavity evaluation: hysteroscopy  Frozen sperm sample: ensure frozen partner sample (z31.41) or verify donor sperm on site prior to stimulation start date.  Verify in EMR or obtain copy of patient’s last mammogram (if applicable) and pap smear results for provider review in boarding pass.  Enroll in Engaged MD and complete annual consent forms for IVF and cryotransport agreements.  BMI checklist for BMI <18 or >40  Consults: Nursing and Financial Consult.  PAT Consult: No  Ectopic Risk: No  Medically Complex: No  Additional consults  partner to complete Urology consult/tx  and review what is in the boarding pass.    Brittney Crouch  12/19/2024  10:09 AM

## 2025-01-16 NOTE — PROGRESS NOTES
Boarding Pass IVF/INJECTABLE TIC/IUI    Age: 39 y.o.    Provider: [facility] Providers:32412}  Primary RN: ***  Reasons for Treatment: {JANELL [id number]:10598}  Last BMI  24 : 23.87 kg/m²       Past Medical History:   Diagnosis Date    Abnormal Pap smear of cervix ?    HPV (human papilloma virus) infection ?       Date Done Consultation Results/Comments   *** Medication Protocol Lead in: {JANELL LEAD IN:35783}  Fertility Plan Update: ***  Trigger plan: {JANELL TRIGGER PLAN:81849}  Pre-retrieval meds: Antibiotics per protocol  Adjuncts: {JANELL ADJUNCTS:81064}  Notes: ***   *** Authorization to Share []      *** GYN Waiver []      *** IVF Consult  []    PGT-A/M? {JANELL PGT-A/M:10884}   *** IVF Information and Authorization (to be completed annually) Received and in chart: {JANELL Yes (Name):61462}   ***  Waiver (Out) Form Received and in chart: {JANELL Yes (Name):63196}   *** [employer] Packet []    *** Procedure Order Placed []       *** MFM Consult Okay to proceed? {JANELL Yes, No, N/A:22944}   *** Psych Consult Okay to proceed? {JANELL Yes, No, N/A:33765}   *** Genetics Consult Okay to proceed? {JANELL Yes, No, N/A:07684}   *** Other    Date Done Female Labs Results/Comments   No results found for requested labs within last 365 days. T&S (Q 1 Year) ABO: No results found for requested labs within last 365 days.  Rh: No results found for requested labs within last 365 days.  Antibody: No results found for requested labs within last 365 days.     No results found for requested labs within last 365 days. Hep B sAg No results found for requested labs within last 365 days.   No results found for requested labs within last 365 days. Hep C AB No results found for requested labs within last 365 days.   No results found for requested labs within last 365 days. HIV No results found for requested labs within last 365 days.   No results found for requested labs within last 365 days. Syphilis No results found for requested labs  within last 365 days.   No results found for requested labs within last 365 days. GC/CT GC: No results found for requested labs within last 365 days.  CT: No results found for requested labs within last 365 days.   No results found for requested labs within last 1825 days. Rubella (Q 5 Years) No results found for requested labs within last 1825 days.   No results found for requested labs within last 1825 days. Varicella (Q 5 Years) No results found for requested labs within last 1825 days.   No results found for requested labs within last 365 days. TSH No results found for requested labs within last 365 days.   No results found for requested labs within last 365 days. HgbA1C No results found for requested labs within last 365 days.   No results found for requested labs within last 365 days. AMH No results found for requested labs within last 365 days.   *** Carrier Screening Myriad 2bP: ***  {JAENLL Carrier Screenin}  {JANELL Carrier Screening Neg/Pos:52919}    Uterine Cavity Eval Pelvic US: ***    HSG: FL HYSTEROSALPINGOGRAM (2024):     SIS: ***    Hyster: (***)     2024 Pap Smear Lab Results   Component Value Date    FINALINTERP  2024         A. THINPREP PAP CERVIX, SCREENING -     Specimen Adequacy  Satisfactory for evaluation; absence of endocervical/transformation zone component    General Categorization  Negative for intraepithelial lesion or malignancy.    Descriptive Interpretation  Negative for intraepithelial lesion or malignancy            CVTFINALDX  2021     A. ENDOCERVIX, CURETTAGE:  -- MINUTE FRAGMENT OF ENDOCERVICAL MUCOSA WITH MILD CHRONIC INFLAMMATION AND  REACTIVE EPITHELIAL CHANGES.    The gross and/or microscopic findings were reviewed in conjunction with  pathology resident, Matt Hernnadez DO.              *** Mammogram ( > 40) ***               Date Done Male Labs   Results/Comments  ALYCE LORENZO (MRN: [id number])   2024 Hep B sAg Nonreactive   2024 Hep  C AB  Nonreactive   2024 HIV Nonreactive   *** Syphilis ***   *** GC/CT GC: ***  CT: ***   *** Carrier Screening 2.6  {JANELL Carrier Screenin}  {JANELL Carrier Screening Neg/Pos:76578}   10/29/2024 Semen Analysis  Volume(mL): 2.6  Concentration(million/mL): 0  Motility(%): 0  Motile Count(million): ***   *** Sperm Freeze  # of vials: ***  TMS post thaw: ***   Date Done Miscellaneous Results/Comments   N/A BMI Checklist  BMI > 40 or < 18 Added to chart:   No   N/A >= 45 Checklist  Added to chart:   No   **Does not need to be completed prior to placing on IVF calendar**    MD Completion:  PAT needed: {YES/NO/NA:86980}  Ectopic Risk: {YES/NO/NA:43431}  Medically Complex: {YES/NO/NA:76947}

## 2025-01-28 ENCOUNTER — TELEPHONE (OUTPATIENT)
Dept: ENDOCRINOLOGY | Facility: CLINIC | Age: 40
End: 2025-01-28

## 2025-01-28 NOTE — TELEPHONE ENCOUNTER
Reason for call: reporting start of cycle  LMP:1/26/25  Treatment type:  Hysteroscopy: Is biopsy needed?   Note:

## 2025-02-03 ENCOUNTER — PREP FOR PROCEDURE (OUTPATIENT)
Dept: ENDOCRINOLOGY | Facility: CLINIC | Age: 40
End: 2025-02-03
Payer: COMMERCIAL

## 2025-02-03 RX ORDER — KETOROLAC TROMETHAMINE 30 MG/ML
30 INJECTION, SOLUTION INTRAMUSCULAR; INTRAVENOUS ONCE AS NEEDED
Status: CANCELLED | OUTPATIENT
Start: 2025-02-03 | End: 2025-02-08

## 2025-02-03 RX ORDER — ACETAMINOPHEN 325 MG/1
650 TABLET ORAL ONCE AS NEEDED
Status: CANCELLED | OUTPATIENT
Start: 2025-02-03

## 2025-02-04 ENCOUNTER — HOSPITAL ENCOUNTER (OUTPATIENT)
Dept: ENDOCRINOLOGY | Facility: CLINIC | Age: 40
Discharge: HOME | End: 2025-02-04
Payer: COMMERCIAL

## 2025-02-04 VITALS
SYSTOLIC BLOOD PRESSURE: 130 MMHG | OXYGEN SATURATION: 99 % | HEIGHT: 68 IN | HEART RATE: 86 BPM | BODY MASS INDEX: 24.29 KG/M2 | WEIGHT: 160.27 LBS | DIASTOLIC BLOOD PRESSURE: 83 MMHG | TEMPERATURE: 98.1 F | RESPIRATION RATE: 18 BRPM

## 2025-02-04 DIAGNOSIS — Z31.41 FERTILITY TESTING: ICD-10-CM

## 2025-02-04 LAB — PREGNANCY TEST URINE, POC: NEGATIVE

## 2025-02-04 PROCEDURE — 64435 NJX AA&/STRD PARACRV NRV: CPT | Mod: GC | Performed by: OBSTETRICS & GYNECOLOGY

## 2025-02-04 PROCEDURE — 58555 HYSTEROSCOPY DX SEP PROC: CPT | Performed by: OBSTETRICS & GYNECOLOGY

## 2025-02-04 PROCEDURE — 58555 HYSTEROSCOPY DX SEP PROC: CPT | Mod: GC | Performed by: OBSTETRICS & GYNECOLOGY

## 2025-02-04 RX ORDER — ACETAMINOPHEN 325 MG/1
650 TABLET ORAL ONCE AS NEEDED
Status: DISCONTINUED | OUTPATIENT
Start: 2025-02-04 | End: 2025-02-05 | Stop reason: HOSPADM

## 2025-02-04 RX ORDER — KETOROLAC TROMETHAMINE 30 MG/ML
30 INJECTION, SOLUTION INTRAMUSCULAR; INTRAVENOUS ONCE AS NEEDED
Status: DISCONTINUED | OUTPATIENT
Start: 2025-02-04 | End: 2025-02-05 | Stop reason: HOSPADM

## 2025-02-04 ASSESSMENT — PAIN SCALES - GENERAL: PAINLEVEL_OUTOF10: 0 - NO PAIN

## 2025-02-04 ASSESSMENT — PAIN - FUNCTIONAL ASSESSMENT: PAIN_FUNCTIONAL_ASSESSMENT: 0-10

## 2025-02-04 NOTE — PROGRESS NOTES
Patient ID: India Fernandez is a 39 y.o. female.    Hysteroscopy diagnostic    Date/Time: 2/4/2025 2:09 PM    Performed by: Mago Brar MD  Authorized by: Brittney Crouch MD    Consent:     Consent obtained:  Verbal and written    Consent given by:  Patient    Risks, benefits, and alternatives were discussed: yes      Risks discussed:  Bleeding, infection and pain  Universal protocol:     Procedure explained and questions answered to patient or proxy's satisfaction: yes      Relevant documents present and verified: yes      Test results available: yes      Imaging studies available: yes      Required blood products, implants, devices, and special equipment available: yes      Immediately prior to procedure, a time out was called: yes      Patient identity confirmed:  Verbally with patient, arm band and hospital-assigned identification number  Pre-procedure details:     Skin preparation:  Povidone-iodine  Procedure specific details:      Procedure: Diagnostic Hysteroscopy   Preop diagnosis: fertility testing  Post op diagnosis: Same   Assistant: Resident and Fellow    Anesthesia: None   IV: None   EBL: 3 cc  Specimens: None   Complications: None   Risks benefits and alternatives of the procedure explained to the patient and informed consent was obtained. Urine pregnancy test was performed and was negative. Time out was performed. The patient was placed in the dorsal lithotomy position and a sterile speculum was placed in the vagina. The cervix was sterilized with Betadine x3. Paracervical block with lidocaine 1% was administered.   Tenaculum: YES  Dilation: NO  The hysteroscope was placed in the cervix and advanced into the uterine cavity. Normal saline was used for distension media. Images were obtained and findings noted as below.   All instruments were then removed. Good hemostasis was noted. Patient tolerated the procedure well returned to the recovery area in stable condition. .   Findings:   Cavity:  Smooth cavity no lesions noted  Ostia: Bilateral tubal ostia visualized  Additional Notes:          Post-procedure details:     Procedure completion:  Tolerated well, no immediate complications

## 2025-04-10 ENCOUNTER — TELEPHONE (OUTPATIENT)
Dept: ENDOCRINOLOGY | Facility: CLINIC | Age: 40
End: 2025-04-10
Payer: COMMERCIAL

## 2025-04-10 NOTE — TELEPHONE ENCOUNTER
Returned patient's call.   Patient calling regarding next steps, her  will have TESE procedure completed on 4/25.   Let patient know her partner still needs to sign consent forms via [employer], reminder email sent.   Cycle orders pended.   Patient aware to call the office with her next period to be scheduled for cycle day 21 P4 level to start estrace lead in for IVF cycle.     Lillian Alvarenga 04/10/25 11:12 AM

## 2025-04-10 NOTE — TELEPHONE ENCOUNTER
Reason for call: Patient partner is having his TESE on 4/25/25. She is calling to get her retrieval medications and next steps.  Notes:

## 2025-04-11 ENCOUNTER — SPECIALTY PHARMACY (OUTPATIENT)
Dept: PHARMACY | Facility: CLINIC | Age: 40
End: 2025-04-11

## 2025-04-14 DIAGNOSIS — N97.9 FEMALE INFERTILITY: ICD-10-CM

## 2025-04-14 RX ORDER — SYRINGE, DISPOSABLE, 3 ML
SYRINGE, EMPTY DISPOSABLE MISCELLANEOUS
Qty: 30 EACH | Refills: 3 | Status: SHIPPED | OUTPATIENT
Start: 2025-04-14

## 2025-04-14 RX ORDER — CHORIONIC GONADOTROPIN 10000 UNIT
10000 KIT INTRAMUSCULAR ONCE AS NEEDED
Qty: 1 EACH | Refills: 0 | Status: SHIPPED | OUTPATIENT
Start: 2025-04-14

## 2025-04-14 RX ORDER — SYRINGE, DISPOSABLE, 3 ML
SYRINGE, EMPTY DISPOSABLE MISCELLANEOUS
Qty: 1 EACH | Refills: 0 | Status: SHIPPED | OUTPATIENT
Start: 2025-04-14

## 2025-04-14 RX ORDER — SYRINGE-NEEDLE,INSULIN,0.5 ML 27GX1/2"
SYRINGE, EMPTY DISPOSABLE MISCELLANEOUS
Qty: 30 EACH | Refills: 3 | Status: SHIPPED | OUTPATIENT
Start: 2025-04-14

## 2025-04-14 RX ORDER — LEUPROLIDE ACETATE 1 MG/0.2ML
10 KIT SUBCUTANEOUS EVERY EVENING
Qty: 1 KIT | Refills: 0 | Status: SHIPPED | OUTPATIENT
Start: 2025-04-14

## 2025-04-14 NOTE — PROGRESS NOTES
Re-routed all fertility medications and supplies to [facility] pharmacy per insurance mandate. Patient can still fill with [facility] for self-pay if needed. Nursing team notified also.    Medications sent:   Follistim 900 IU cartridge, Menopur 75 unit vials, Pregnyl 10,000IU vial for trigger , and Leuprolide 1mg/0.2mL kit for Long Lupron     Patient has [employer]yn[employer] coverage, must fill with [facility]. Progyny to handle Pas and communicate with [facility].      Yoanna River (Katie), PharmD  [facility]  Clinical Pharmacy Specialist- Fertility   [facility], [place] [address]  Email: [email or web address]  Tel: [phone]       Fax: [phone]

## 2025-04-22 ENCOUNTER — TELEPHONE (OUTPATIENT)
Dept: ENDOCRINOLOGY | Facility: CLINIC | Age: 40
End: 2025-04-22
Payer: COMMERCIAL

## 2025-04-22 DIAGNOSIS — N97.9 FEMALE INFERTILITY: ICD-10-CM

## 2025-04-22 NOTE — TELEPHONE ENCOUNTER
Returned patient's call.  Patient began period 4/21 and would like to get set up for IVF cycle.   Only outstanding boarding pass items are 's TESE procedure scheduled for 4/25 and genetic authorization form.   Let patient know next steps would be scheduling CD21 P4 on 5/12.  Patient let me know she has a vacation scheduled 4/30-6/8 and wondering if that will interfere with her IVF cycle, vacation cannot be rescheduled.   Discussed with patient she will likely be in the middle of her stimulation during that time and would be best to call back with her May period.  Patient to call office with May period.     Lillian Alvarenga 04/22/25 2:14 PM

## 2025-04-22 NOTE — TELEPHONE ENCOUNTER
Caller:   Reason for call: questions regarding next steps  Notes:  Patient started cycle today 4/22 and wants to know what she needs to schedule next

## 2025-06-25 ENCOUNTER — TELEPHONE (OUTPATIENT)
Dept: ENDOCRINOLOGY | Facility: CLINIC | Age: 40
End: 2025-06-25

## 2025-06-25 NOTE — TELEPHONE ENCOUNTER
Reason for call:   Notes: pt called  ans stated that she needs a CD21 progesterone test. Pt wants a call back.

## 2025-06-25 NOTE — TELEPHONE ENCOUNTER
Returned patient's call.   Patient calling to schedule CD 21 P4, states she started her period on 6/15 but was busy and did not get a chance to call the office.   Patient completed all check list items however her  did not have TESE procedure completed as it was cancelled in EPIC/no results available.   Patient states her  did have the procedure but no sperm was retrieved.   Patient would still like to go through with egg freezing at this time until they figure out next steps regarding sperm source.   Let her know I would have to discuss with her provider since their is a change in her plan and call her back.

## 2025-07-22 ENCOUNTER — TELEMEDICINE (OUTPATIENT)
Dept: ENDOCRINOLOGY | Facility: CLINIC | Age: 40
End: 2025-07-22
Payer: COMMERCIAL

## 2025-07-22 DIAGNOSIS — N97.9 FEMALE INFERTILITY: Primary | ICD-10-CM

## 2025-07-22 PROCEDURE — 99215 OFFICE O/P EST HI 40 MIN: CPT | Performed by: OBSTETRICS & GYNECOLOGY

## 2025-07-22 NOTE — PROGRESS NOTES
Virtual or Telephone Consent: An interactive audio and video telecommunication system which permits real time communications between the patient (at the originating site) and provider (at the distant site) was utilized to provide this telehealth service and Verbal consent was requested and obtained from India Fernandez on this date, 25 for a telehealth visit.    IVF Note - Follow up visit    Patient of Dr. Crouch here for follow up:     has azoospermia - had TESE done 2025 and no sperm found  Done at [facility] ([facility] in [place])    At this point patient is not ready to proceed with donor sperm so would like to consider egg freezing    Previous note reviewed:  Patient is a 40 y.o.  female, here for IVF consult due to Male infertility  Here today with: NA  LMP: No LMP recorded.  Menstrual cycles: Regular  AMH: 1.65  Status of fallopian tubes: FL HYSTEROSALPINGOGRAM (2024):  Normal cavity, bilateral spill   Saline Ultrasound: never  Hysteroscopy: to be ordered    GYN Pelvic Ultrasound: US PELVIS TRANSVAGINAL (2024):  Somewhat increased vascularity in myometrium of uterus may be suggestive of adenomyosis. Ovaries within normal limits bilaterally. Small free fluid in the cul-de-sac.        Past Infertility Treatments: None  Fertility Cycles       Cycle Name Treatment Start Date Type Outcome    IVF#1  Retrieval, Cryopreservation (Embryo) Active          [facility] 10/02/2024  GC/CT negative  AMH 1.65  Rubella immune  RPR nonreactive  HIV nonreactive  Varicella immune  Hep C nonreactive  TSH 2.27  HbSAg negative    GYN HISTORY   HX of abnormal Mammo: No  Pap smear: 2024 NIL, HR HPV neg  Mammogram: never     PMH  Past Medical History:   Diagnosis Date    Abnormal Pap smear of cervix ?    HPV (human papilloma virus) infection ?     History of any clotting: No  History of hospitalizations or surgeries: No  History of easy bleeding/bruising: No    PSH  Past Surgical History:  "  Procedure Laterality Date    COLPOSCOPY  1/9/21?       Genetic screening Hx  Patient: has not done yet    Social history  Social History     Tobacco Use    Smoking status: Never     Passive exposure: Never    Smokeless tobacco: Never   Substance Use Topics    Alcohol use: Yes     Alcohol/week: 4.0 - 6.0 standard drinks of alcohol     Types: 2 - 3 Glasses of wine, 2 - 3 Cans of beer per week     Comment: socially    Drug use: Never     Current smoker: No    Family history  Cognitive deficits: No  Birth defects: No  Other:     Current Meds  Current Outpatient Medications   Medication Sig Dispense Refill    chorionic gonadotropin (Pregnyl) 10,000 unit injection Reconstitute according to instructions and inject 10,000 units (1 mL) under the skin as a one time dose, as directed per provider for trigger. 1 each 0    estradiol (Estrace) 2 mg tablet Take 1 tablet (2 mg) by mouth 2 times a day. Take 1 tablet by mouth twice daily as directed by provider 60 tablet 0    follitropin beta (Follistim AQ) 900 unit/1.08 mL injection Inject 300 Units under the skin once daily in the evening. Inject under the skin as directed by provider 3 each 2    insulin syringe-needle U-100 (Insulin Syringe MicroFine) 1/2 mL 28 gauge x 1/2\" syringe Use as directed to inject Lupron 30 each 3    leuprolide (Lupron) 1 mg/0.2 mL injection Inject 0.1 mL (10 Units total) under the skin once daily in the evening, as directed by provider. 1 kit 0    menotropins (Menopur) 75 unit recon soln injection Inject 150 Units under the skin once daily in the evening. Inject under the skin as directed by provider 20 each 2    needle, disp, 22 G 22 gauge x 1 1/2\" needle Use as directed to mix HCG trigger 1 each 0    needle, disp, 27 gauge 27 gauge x 1/2\" needle Use as directed to inject HCG trigger (subcutaneous) 1 each 0    needle, disp, 27 gauge 27 gauge x 1/2\" needle Use as directed to inject Menopur 30 each 2    pen injector, for follitropin, pen injector " Inject 1 Pen under the skin once daily. Use as directed to admin Follistim 1 each 0    syringe, disposable, (BD Safety-Kathrin with Luer-Kathrin) 3 mL syringe Use as directed to mix and administer HCG trigger 1 each 0    syringe, disposable, (BD Safety-Kathrin with Luer-Kathrin) 3 mL syringe Use as directed to mix and administer Menopur 30 each 3     No current facility-administered medications for this visit.       Allergies  Patient has no known allergies.    Partner History  Tres Fernandez  Partner :: 83  SA in past year: Yes, Azoospermia      VITALS:  There were no vitals taken for this visit.  BMI:   BMI Readings from Last 1 Encounters:   25 24.37 kg/m²     ASSESSMENT   40 y.o.  female desiring preservation for Age, partner with azoospermia, not ready for donor sperm yet.    COUNSELING  We discussed options for fertility preservation including oocyte cryopreservation and embryo cryopreservation, and discussed advances in cryopreservation specifically the optimization of vitrification to enhance oocyte freezing and enhance the likelihood of pregnancies after thaw.  Discussed with patient the clinical data that currently exists about efficacy of oocyte cryopreservation as a strategy for fertility preservation and that this is an evolving area. At present several concepts have been demonstrated. Increased maternal age likely impacts total egg yield and likelihood of pregnancy after thaw. Offspring outcomes appear to be comparable for children conceived after IVF and oocyte freezing/thaw/IVF but research in this area is evolving and much more outcomes data exists for children conceived after embryo cryo than oocyte cryo. An upper limit on duration of time that oocytes can be frozen and still result in a pregnancy has not been defined. There is no guarantee for a pregnancy with any amount of oocytes cryopreserved. Reviewed nature of stimulation, injectable hormones used, and monitoring process and the process  of oocyte retrieval as an outpatient surgical procedure to harvest oocytes. Risks of this procedure include ovarian hyperstimulation syndrome, anesthesia risks during egg retrieval. Reviewed need to consult with financial specialists in our office to delineate coverage and costs.    IVF Plan  40 y.o. desires to proceed with IVF/egg freezing.     We reviewed IVF and discussed the following:  Stimulation Protocols  Oocyte retrieval, risks   Cryopreservation  Subsequent In-vitro fertilization and embryo transfer  Statistics for success  ICSI/ Assisted hatching  Risks of OHSS  Dropped cycles  Use of birth control  Team of physicians  Informed consent procedure  Folic acid supplementation    ART Cycle Plan    1. Protocol/Fertility Plan Update:   Lead in: Lupron  Stimulation protocol: Long Lupron /Menopur 150  Trigger plan: HCG  Pre-retrieval meds: Antibiotics per protocol  Adjuncts: None  Notes:  This was protocol Dr. Crouch had selected for IVF and patient already has medications    2. FET: TBD     3. Insemination:  Sperm source: na  Sperm collection method: None  Notes:  ICSI: No  # of oocytes to be fertilized: na    4. Transfer: TBD    5. Cryopreservation plan  PGT: No   Oocyte cryopreservation? Yes, Freeze mature eggs only    6. Patient willing to accept blood transfusion: No    7. RN to review chart, initiate IVF boarding pass, and assure completion of the following prior to proceeding with IVF stimulation:       Orders Placed This Encounter   Procedures    Referral to Financial Planning    JANELL Referral to OBGYN Behavioral Health (Pyschotherapy)       STDs (Hepatitis B, Hepatitis C, HIV, Syphilis, GC/CT) for patient and partner (if     applicable) to  be completed within the last year (z11.3)  Genetic carrier testing: waiver or carrier screen completed with clearance    documentation   by provider for both patient and partner (Z13.71)  CBC if not performed within the last month (Z01.81)  Type & Screen if not  performed within the last month(z01.83)  AMH to be completed within the last year (z31.41)  Pre-IVF Imaging: Reference any orders placed by provider  Frozen sperm sample: ensure frozen partner sample (z31.41) or verify donor sperm on   site  prior to stimulation start date.  Verify in EMR or obtain copy of patient’s last mammogram (if applicable) and pap smear  results for provider review in boarding pass.  Enroll in Engaged MD and complete annual consent forms for IVF and cryotransport  Agreements.  Consults: nurse consult and financial consult  Additional consults Behavior Medicine consult (optional- if patient desires support for reproductive decision making) and review what is in the boarding  Pass.    Intimate Exam Performed: No, an intimate exam was not performed at this encounter.     Kristy Ruvalcaba  07/22/2025  9:11 AM